# Patient Record
Sex: FEMALE | Race: WHITE
[De-identification: names, ages, dates, MRNs, and addresses within clinical notes are randomized per-mention and may not be internally consistent; named-entity substitution may affect disease eponyms.]

---

## 2018-05-04 NOTE — OP
DATE OF PROCEDURE:  05/04/2018

 

PREOPERATIVE DIAGNOSIS:  Small finger blue domed cyst.

 

POSTOPERATIVE DIAGNOSIS:  A 6 x 4 mm blue domed cyst slightly more radial than central, mid proximal 
phalanx.

 

PROCEDURES PERFORMED:

1.  Excisional biopsy of blue domed cyst, left small finger.

2.  Ulnar digital nerve neuroplasty, small finger.

3.  Application of Celestone, 3 mL in the area of the cyst and neuroplasty.

 

TOURNIQUET TIME:  5 minutes.

 

SURGEON:  Grady Shaikh M.D.

 

ANESTHESIA:  General LMA technique augmented by Marcaine block 8 mL.

 

INDICATIONS:  The patient with a painful mass that oscillates in size, sometimes it is blue color and
 sometimes not, and would not resolve with rest and immobilization.

 

DESCRIPTION OF PROCEDURE:  After successful general LMA technique, the limb was prepped and draped.  
Timeout was done appropriately and we then injected the area at the metacarpophalangeal joint with Ma
rcaine.  A tourniquet was inflated after exsanguination of the limb to 250 mmHg pressure.  We made a 
modified Flory incision centered over the mass and dissected back towards the midline and then local
ized the mass.  Back wall of the mass was almost adherent to the nerve sheath on the ulnar side, so I
 did do a neuroplasty to separate the nerve.  This was done without complication.

 

Then lifted the cyst off the center portion of the fascia and it was cystic, and it was blue color co
nsist of, I believe blue domed cyst of possible venous origin.  We then removed the cyst, placed a 3 
mL of Celestone over the area of the dissection with drip technique.  Then, the tourniquet was deflat
ed.  Hemostasis obtained.  Closed the wound with interrupted 5-0 nylon in a simple pattern.  Bulky dr
essing was applied and the patient left the operating room without evidence of anesthetic or operativ
e complication.

## 2021-05-04 ENCOUNTER — HOSPITAL ENCOUNTER (OUTPATIENT)
Dept: HOSPITAL 92 - CSHLAB | Age: 41
Discharge: HOME | End: 2021-05-04
Attending: OBSTETRICS & GYNECOLOGY
Payer: COMMERCIAL

## 2021-05-04 DIAGNOSIS — N85.2: ICD-10-CM

## 2021-05-04 DIAGNOSIS — N94.6: ICD-10-CM

## 2021-05-04 DIAGNOSIS — N93.9: ICD-10-CM

## 2021-05-04 DIAGNOSIS — N80.0: ICD-10-CM

## 2021-05-04 DIAGNOSIS — Z20.822: ICD-10-CM

## 2021-05-04 DIAGNOSIS — Z01.812: Primary | ICD-10-CM

## 2021-05-04 LAB
HGB BLD-MCNC: 12.3 G/DL (ref 12–15.5)
MCH RBC QN AUTO: 30.3 PG (ref 27–33)
MCV RBC AUTO: 92.6 FL (ref 81.6–98.3)
PLATELET # BLD AUTO: 208 10X3/UL (ref 150–450)
PREGS CONTROL BACKGROUND?: (no result)
PREGS CONTROL BAR APPEAR?: YES
RBC # BLD AUTO: 4.06 10X6/UL (ref 3.9–5.03)
SP GR UR STRIP: 1 (ref 1–1.04)
WBC # BLD AUTO: 5.4 10X3/UL (ref 3.5–10.5)

## 2021-05-04 PROCEDURE — 87635 SARS-COV-2 COVID-19 AMP PRB: CPT

## 2021-05-04 PROCEDURE — U0003 INFECTIOUS AGENT DETECTION BY NUCLEIC ACID (DNA OR RNA); SEVERE ACUTE RESPIRATORY SYNDROME CORONAVIRUS 2 (SARS-COV-2) (CORONAVIRUS DISEASE [COVID-19]), AMPLIFIED PROBE TECHNIQUE, MAKING USE OF HIGH THROUGHPUT TECHNOLOGIES AS DESCRIBED BY CMS-2020-01-R: HCPCS

## 2021-05-04 PROCEDURE — 81001 URINALYSIS AUTO W/SCOPE: CPT

## 2021-05-04 PROCEDURE — 84703 CHORIONIC GONADOTROPIN ASSAY: CPT

## 2021-05-04 PROCEDURE — U0005 INFEC AGEN DETEC AMPLI PROBE: HCPCS

## 2021-05-04 PROCEDURE — 85027 COMPLETE CBC AUTOMATED: CPT

## 2021-05-07 ENCOUNTER — HOSPITAL ENCOUNTER (OUTPATIENT)
Dept: HOSPITAL 92 - CSHSDC | Age: 41
Discharge: HOME | End: 2021-05-07
Attending: OBSTETRICS & GYNECOLOGY
Payer: COMMERCIAL

## 2021-05-07 VITALS — BODY MASS INDEX: 22.1 KG/M2

## 2021-05-07 DIAGNOSIS — Z88.0: ICD-10-CM

## 2021-05-07 DIAGNOSIS — Z79.899: ICD-10-CM

## 2021-05-07 DIAGNOSIS — N84.0: Primary | ICD-10-CM

## 2021-05-07 DIAGNOSIS — N80.0: ICD-10-CM

## 2021-05-07 DIAGNOSIS — N92.1: ICD-10-CM

## 2021-05-07 DIAGNOSIS — N94.5: ICD-10-CM

## 2021-05-07 DIAGNOSIS — Z91.018: ICD-10-CM

## 2021-05-07 DIAGNOSIS — Z88.1: ICD-10-CM

## 2021-05-07 DIAGNOSIS — Z88.5: ICD-10-CM

## 2021-05-07 PROCEDURE — L8699 PROSTHETIC IMPLANT NOS: HCPCS

## 2021-05-07 PROCEDURE — 0U5B8ZZ DESTRUCTION OF ENDOMETRIUM, VIA NATURAL OR ARTIFICIAL OPENING ENDOSCOPIC: ICD-10-PCS | Performed by: OBSTETRICS & GYNECOLOGY

## 2021-05-07 PROCEDURE — 0UB98ZX EXCISION OF UTERUS, VIA NATURAL OR ARTIFICIAL OPENING ENDOSCOPIC, DIAGNOSTIC: ICD-10-PCS | Performed by: OBSTETRICS & GYNECOLOGY

## 2021-05-07 PROCEDURE — 88305 TISSUE EXAM BY PATHOLOGIST: CPT

## 2021-08-05 ENCOUNTER — HOSPITAL ENCOUNTER (OUTPATIENT)
Dept: HOSPITAL 92 - CTENTCT | Age: 41
Discharge: HOME | End: 2021-08-05
Attending: OTOLARYNGOLOGY
Payer: COMMERCIAL

## 2021-08-05 DIAGNOSIS — J32.9: Primary | ICD-10-CM

## 2021-08-05 PROCEDURE — 70486 CT MAXILLOFACIAL W/O DYE: CPT

## 2021-08-27 ENCOUNTER — HOSPITAL ENCOUNTER (OUTPATIENT)
Dept: HOSPITAL 92 - LABBT | Age: 41
Discharge: HOME | End: 2021-08-27
Attending: OTOLARYNGOLOGY
Payer: COMMERCIAL

## 2021-08-27 DIAGNOSIS — Z20.822: ICD-10-CM

## 2021-08-27 DIAGNOSIS — G50.1: ICD-10-CM

## 2021-08-27 DIAGNOSIS — J32.8: ICD-10-CM

## 2021-08-27 DIAGNOSIS — J34.3: ICD-10-CM

## 2021-08-27 DIAGNOSIS — Z01.812: Primary | ICD-10-CM

## 2021-08-27 DIAGNOSIS — R09.81: ICD-10-CM

## 2021-08-27 PROCEDURE — U0003 INFECTIOUS AGENT DETECTION BY NUCLEIC ACID (DNA OR RNA); SEVERE ACUTE RESPIRATORY SYNDROME CORONAVIRUS 2 (SARS-COV-2) (CORONAVIRUS DISEASE [COVID-19]), AMPLIFIED PROBE TECHNIQUE, MAKING USE OF HIGH THROUGHPUT TECHNOLOGIES AS DESCRIBED BY CMS-2020-01-R: HCPCS

## 2021-08-27 PROCEDURE — 85014 HEMATOCRIT: CPT

## 2021-08-27 PROCEDURE — U0005 INFEC AGEN DETEC AMPLI PROBE: HCPCS

## 2021-12-14 ENCOUNTER — HOSPITAL ENCOUNTER (OUTPATIENT)
Dept: HOSPITAL 92 - LABBT | Age: 41
Discharge: HOME | End: 2021-12-14
Attending: OTOLARYNGOLOGY
Payer: COMMERCIAL

## 2021-12-14 DIAGNOSIS — Z20.822: ICD-10-CM

## 2021-12-14 DIAGNOSIS — Z01.812: Primary | ICD-10-CM

## 2021-12-14 PROCEDURE — 85014 HEMATOCRIT: CPT

## 2021-12-14 PROCEDURE — U0005 INFEC AGEN DETEC AMPLI PROBE: HCPCS

## 2021-12-14 PROCEDURE — U0003 INFECTIOUS AGENT DETECTION BY NUCLEIC ACID (DNA OR RNA); SEVERE ACUTE RESPIRATORY SYNDROME CORONAVIRUS 2 (SARS-COV-2) (CORONAVIRUS DISEASE [COVID-19]), AMPLIFIED PROBE TECHNIQUE, MAKING USE OF HIGH THROUGHPUT TECHNOLOGIES AS DESCRIBED BY CMS-2020-01-R: HCPCS

## 2021-12-15 ENCOUNTER — HOSPITAL ENCOUNTER (OUTPATIENT)
Dept: HOSPITAL 92 - SDC | Age: 41
Discharge: HOME | End: 2021-12-15
Attending: OTOLARYNGOLOGY
Payer: COMMERCIAL

## 2021-12-15 VITALS — BODY MASS INDEX: 22.3 KG/M2

## 2021-12-15 DIAGNOSIS — J34.3: ICD-10-CM

## 2021-12-15 DIAGNOSIS — J32.9: ICD-10-CM

## 2021-12-15 DIAGNOSIS — Z88.6: ICD-10-CM

## 2021-12-15 DIAGNOSIS — Z91.018: ICD-10-CM

## 2021-12-15 DIAGNOSIS — Z88.0: ICD-10-CM

## 2021-12-15 DIAGNOSIS — H69.83: ICD-10-CM

## 2021-12-15 DIAGNOSIS — H72.93: Primary | ICD-10-CM

## 2021-12-15 DIAGNOSIS — J30.9: ICD-10-CM

## 2021-12-15 DIAGNOSIS — Z88.5: ICD-10-CM

## 2021-12-15 DIAGNOSIS — Z79.899: ICD-10-CM

## 2021-12-15 PROCEDURE — C1726 CATH, BAL DIL, NON-VASCULAR: HCPCS

## 2021-12-15 PROCEDURE — 09U88JZ SUPPLEMENT LEFT TYMPANIC MEMBRANE WITH SYNTHETIC SUBSTITUTE, VIA NATURAL OR ARTIFICIAL OPENING ENDOSCOPIC: ICD-10-PCS | Performed by: OTOLARYNGOLOGY

## 2021-12-15 PROCEDURE — 097F8ZZ DILATION OF RIGHT EUSTACHIAN TUBE, VIA NATURAL OR ARTIFICIAL OPENING ENDOSCOPIC: ICD-10-PCS | Performed by: OTOLARYNGOLOGY

## 2021-12-15 PROCEDURE — 09U78JZ SUPPLEMENT RIGHT TYMPANIC MEMBRANE WITH SYNTHETIC SUBSTITUTE, VIA NATURAL OR ARTIFICIAL OPENING ENDOSCOPIC: ICD-10-PCS | Performed by: OTOLARYNGOLOGY

## 2021-12-15 PROCEDURE — 097G8ZZ DILATION OF LEFT EUSTACHIAN TUBE, VIA NATURAL OR ARTIFICIAL OPENING ENDOSCOPIC: ICD-10-PCS | Performed by: OTOLARYNGOLOGY

## 2022-07-06 ENCOUNTER — HOSPITAL ENCOUNTER (OUTPATIENT)
Dept: HOSPITAL 92 - LABBT | Age: 42
Discharge: HOME | End: 2022-07-06
Attending: OTOLARYNGOLOGY
Payer: COMMERCIAL

## 2022-07-06 DIAGNOSIS — H69.82: ICD-10-CM

## 2022-07-06 DIAGNOSIS — H92.12: ICD-10-CM

## 2022-07-06 DIAGNOSIS — H72.92: Primary | ICD-10-CM

## 2022-07-06 PROCEDURE — 85014 HEMATOCRIT: CPT

## 2022-07-06 PROCEDURE — 87811 SARS-COV-2 COVID19 W/OPTIC: CPT

## 2022-07-11 ENCOUNTER — HOSPITAL ENCOUNTER (OUTPATIENT)
Dept: HOSPITAL 92 - SDC | Age: 42
Discharge: HOME | End: 2022-07-11
Attending: OTOLARYNGOLOGY
Payer: COMMERCIAL

## 2022-07-11 VITALS — BODY MASS INDEX: 23.1 KG/M2

## 2022-07-11 DIAGNOSIS — Z88.0: ICD-10-CM

## 2022-07-11 DIAGNOSIS — H66.92: Primary | ICD-10-CM

## 2022-07-11 DIAGNOSIS — H72.92: ICD-10-CM

## 2022-07-11 DIAGNOSIS — Z79.899: ICD-10-CM

## 2022-07-11 DIAGNOSIS — Z91.018: ICD-10-CM

## 2022-07-11 DIAGNOSIS — Z88.5: ICD-10-CM

## 2022-07-11 PROCEDURE — C1781 MESH (IMPLANTABLE): HCPCS

## 2022-07-11 PROCEDURE — S0020 INJECTION, BUPIVICAINE HYDRO: HCPCS

## 2022-07-11 PROCEDURE — 09U607Z SUPPLEMENT LEFT MIDDLE EAR WITH AUTOLOGOUS TISSUE SUBSTITUTE, OPEN APPROACH: ICD-10-PCS | Performed by: OTOLARYNGOLOGY

## 2022-07-11 PROCEDURE — 0NB60ZZ EXCISION OF LEFT TEMPORAL BONE, OPEN APPROACH: ICD-10-PCS | Performed by: OTOLARYNGOLOGY

## 2024-05-30 ENCOUNTER — HOSPITAL ENCOUNTER (OUTPATIENT)
Dept: HOSPITAL 92 - CSHMAMMO | Age: 44
Discharge: HOME | End: 2024-05-30
Attending: OBSTETRICS & GYNECOLOGY
Payer: COMMERCIAL

## 2024-05-30 DIAGNOSIS — Z98.82: ICD-10-CM

## 2024-05-30 DIAGNOSIS — Z12.31: Primary | ICD-10-CM

## 2024-05-30 PROCEDURE — 77063 BREAST TOMOSYNTHESIS BI: CPT

## 2024-05-30 PROCEDURE — 77067 SCR MAMMO BI INCL CAD: CPT
